# Patient Record
Sex: MALE | Race: WHITE | NOT HISPANIC OR LATINO | Employment: FULL TIME | URBAN - METROPOLITAN AREA
[De-identification: names, ages, dates, MRNs, and addresses within clinical notes are randomized per-mention and may not be internally consistent; named-entity substitution may affect disease eponyms.]

---

## 2020-08-07 ENCOUNTER — HOSPITAL ENCOUNTER (OUTPATIENT)
Dept: URGENT CARE | Facility: CLINIC | Age: 30
Discharge: HOME | End: 2020-08-07
Attending: PHYSICIAN ASSISTANT

## 2023-04-27 ENCOUNTER — OFFICE VISIT (OUTPATIENT)
Dept: FAMILY MEDICINE CLINIC | Facility: CLINIC | Age: 33
End: 2023-04-27
Payer: COMMERCIAL

## 2023-04-27 VITALS
WEIGHT: 172.6 LBS | HEIGHT: 72 IN | SYSTOLIC BLOOD PRESSURE: 132 MMHG | DIASTOLIC BLOOD PRESSURE: 76 MMHG | OXYGEN SATURATION: 100 % | HEART RATE: 65 BPM | BODY MASS INDEX: 23.38 KG/M2

## 2023-04-27 DIAGNOSIS — Z13.220 LIPID SCREENING: ICD-10-CM

## 2023-04-27 DIAGNOSIS — S33.5XXA LUMBAR BACK SPRAIN, INITIAL ENCOUNTER: Primary | ICD-10-CM

## 2023-04-27 DIAGNOSIS — Z11.59 NEED FOR HEPATITIS C SCREENING TEST: ICD-10-CM

## 2023-04-27 DIAGNOSIS — Z13.6 SCREENING FOR CARDIOVASCULAR CONDITION: ICD-10-CM

## 2023-04-27 DIAGNOSIS — Z13.1 DIABETES MELLITUS SCREENING: ICD-10-CM

## 2023-04-27 DIAGNOSIS — Z13.29 THYROID DISORDER SCREENING: ICD-10-CM

## 2023-04-27 RX ORDER — METHOCARBAMOL 750 MG/1
750 TABLET, FILM COATED ORAL 3 TIMES DAILY PRN
Qty: 60 TABLET | Refills: 0 | Status: SHIPPED | OUTPATIENT
Start: 2023-04-27

## 2023-04-27 RX ORDER — PREDNISONE 20 MG/1
20 TABLET ORAL DAILY
Qty: 10 TABLET | Refills: 0 | Status: SHIPPED | OUTPATIENT
Start: 2023-04-27

## 2023-04-27 NOTE — PROGRESS NOTES
Chief Complaint  Establish Care and Back Pain (In between shoulder blades and lower back, no injury occurred  )    Subjective          Sam Perez is a 33 y.o. male who presents to [facility]    History of Present Illness    Complains of lower back pain and between shoulder blades.  Recently has started to do stretches and that seems to have helped some.  More activity that he does the more that it bothers him.  Naproxen has helped a little.    PHQ-2 Total Score: 0   PHQ-9 Total Score: 0        Review of Systems       Medical History: has a past medical history of Asthma.     Surgical History: has a past surgical history that includes Hernia repair.     Family History: family history is not on file.     Social History: reports that he has been smoking cigarettes. He has been smoking an average of .5 packs per day. He has quit using smokeless tobacco.  His smokeless tobacco use included snuff. He reports current alcohol use. He reports that he does not use drugs.    Allergies: Brompheniramine-phenylephrine      Health Maintenance Due   Topic Date Due   • COVID-19 Vaccine (1) Never done   • Pneumococcal Vaccine 0-64 (1 - PCV) Never done   • TDAP/TD VACCINES (1 - Tdap) Never done   • HEPATITIS C SCREENING  Never done   • ANNUAL PHYSICAL  Never done            Current Outpatient Medications:   •  diclofenac (VOLTAREN) 50 MG EC tablet, Take 1 tablet by mouth 2 (Two) Times a Day As Needed (low back pain)., Disp: 60 tablet, Rfl: 2  •  methocarbamol (ROBAXIN) 750 MG tablet, Take 1 tablet by mouth 3 (Three) Times a Day As Needed for Muscle Spasms., Disp: 60 tablet, Rfl: 0  •  predniSONE (DELTASONE) 20 MG tablet, Take 1 tablet by mouth Daily., Disp: 10 tablet, Rfl: 0        There is no immunization history on file for this patient.      Objective       Vitals:    04/27/23 0704   BP: 132/76   BP Location: Left arm   Patient Position: Sitting   Cuff Size: Adult   Pulse: 65   SpO2: 100%  "  Weight: 78.3 kg (172 lb 9.6 oz)   Height: 182.9 cm (72\")   PainSc:   2   PainLoc: Back      Body mass index is 23.41 kg/m².   Wt Readings from Last 3 Encounters:   04/27/23 78.3 kg (172 lb 9.6 oz)   04/02/23 80.3 kg (177 lb)   03/22/23 78.8 kg (173 lb 11.2 oz)      BP Readings from Last 3 Encounters:   04/27/23 132/76   04/02/23 134/72   03/22/23 133/56        Physical Exam  Vitals reviewed.   Constitutional:       Appearance: Normal appearance.   HENT:      Head: Normocephalic and atraumatic.   Eyes:      Conjunctiva/sclera: Conjunctivae normal.      Pupils: Pupils are equal, round, and reactive to light.   Cardiovascular:      Rate and Rhythm: Normal rate and regular rhythm.      Pulses: Normal pulses.      Heart sounds: Normal heart sounds.   Pulmonary:      Effort: Pulmonary effort is normal.      Breath sounds: Normal breath sounds.   Abdominal:      General: Bowel sounds are normal.      Palpations: Abdomen is soft.   Musculoskeletal:      Lumbar back: Spasms and tenderness present.   Skin:     General: Skin is warm and dry.   Neurological:      Mental Status: He is alert and oriented to person, place, and time.   Psychiatric:         Mood and Affect: Mood normal.         Behavior: Behavior normal.         Thought Content: Thought content normal.         Judgment: Judgment normal.             Result Review :                          Assessment and Plan        Diagnoses and all orders for this visit:    1. Lumbar back sprain, initial encounter (Primary)  -     methocarbamol (ROBAXIN) 750 MG tablet; Take 1 tablet by mouth 3 (Three) Times a Day As Needed for Muscle Spasms.  Dispense: 60 tablet; Refill: 0  -     diclofenac (VOLTAREN) 50 MG EC tablet; Take 1 tablet by mouth 2 (Two) Times a Day As Needed (low back pain).  Dispense: 60 tablet; Refill: 2  -     predniSONE (DELTASONE) 20 MG tablet; Take 1 tablet by mouth Daily.  Dispense: 10 tablet; Refill: 0    2. Diabetes mellitus screening  -     Comprehensive " Metabolic Panel; Future    3. Lipid screening  -     Lipid Panel With / Chol / HDL Ratio; Future    4. Screening for cardiovascular condition  -     CBC & Differential; Future    5. Thyroid disorder screening  -     T4, Free; Future  -     TSH; Future    6. Need for hepatitis C screening test  -     Hepatitis C antibody; Future          Follow Up     Return in about 4 weeks (around 5/25/2023).    Patient was given instructions and counseling regarding his condition or for health maintenance advice. Please see specific information pulled into the AVS if appropriate.     Kymberly Sheets, APRN

## 2023-06-09 ENCOUNTER — OFFICE VISIT (OUTPATIENT)
Dept: FAMILY MEDICINE CLINIC | Facility: CLINIC | Age: 33
End: 2023-06-09
Payer: COMMERCIAL

## 2023-06-09 VITALS
OXYGEN SATURATION: 100 % | WEIGHT: 172.3 LBS | BODY MASS INDEX: 23.34 KG/M2 | HEIGHT: 72 IN | SYSTOLIC BLOOD PRESSURE: 113 MMHG | HEART RATE: 70 BPM | DIASTOLIC BLOOD PRESSURE: 74 MMHG

## 2023-06-09 DIAGNOSIS — S33.5XXS LUMBAR BACK SPRAIN, SEQUELA: Primary | ICD-10-CM

## 2023-06-09 RX ORDER — MELOXICAM 15 MG/1
15 TABLET ORAL DAILY
Qty: 30 TABLET | Refills: 1 | Status: SHIPPED | OUTPATIENT
Start: 2023-06-09

## 2023-06-09 RX ORDER — CYCLOBENZAPRINE HCL 10 MG
10 TABLET ORAL 3 TIMES DAILY PRN
Qty: 60 TABLET | Refills: 0 | Status: SHIPPED | OUTPATIENT
Start: 2023-06-09

## 2023-06-09 NOTE — ASSESSMENT & PLAN NOTE
Advised that it may take 8-12 weeks for pain to subside.  If it continues longer than that then advised to have a MRI of Lumbar spine.

## 2023-06-09 NOTE — PROGRESS NOTES
"Chief Complaint  Back Pain (Has got better since last appt )    Subjective          Sam Perez is a 33 y.o. male who presents to [facility]    History of Present Illness  Back pain has improved, Robaxin and diclofenac didn't seem to help much.  What helped the most was rest.  He does notice after mowing yard for heavy activity it will start hurting him again.    PHQ-2 Total Score:     PHQ-9 Total Score:          Review of Systems       Medical History: has a past medical history of Asthma.     Surgical History: has a past surgical history that includes Hernia repair.     Family History: family history is not on file.     Social History: reports that he has been smoking cigarettes. He has been smoking an average of .5 packs per day. He has quit using smokeless tobacco.  His smokeless tobacco use included snuff. He reports current alcohol use. He reports that he does not use drugs.    Allergies: Brompheniramine-phenylephrine      Health Maintenance Due   Topic Date Due    COVID-19 Vaccine (1) Never done    Pneumococcal Vaccine 0-64 (1 - PCV) Never done    TDAP/TD VACCINES (1 - Tdap) Never done    HEPATITIS C SCREENING  Never done    ANNUAL PHYSICAL  Never done            Current Outpatient Medications:     cyclobenzaprine (FLEXERIL) 10 MG tablet, Take 1 tablet by mouth 3 (Three) Times a Day As Needed for Muscle Spasms., Disp: 60 tablet, Rfl: 0    meloxicam (Mobic) 15 MG tablet, Take 1 tablet by mouth Daily., Disp: 30 tablet, Rfl: 1      Immunization History   Administered Date(s) Administered    MMR 04/16/1997         Objective       Vitals:    06/09/23 0706   BP: 113/74   BP Location: Left arm   Patient Position: Sitting   Cuff Size: Adult   Pulse: 70   SpO2: 100%   Weight: 78.2 kg (172 lb 4.8 oz)   Height: 182.9 cm (72\")   PainSc:   2   PainLoc: Back      Body mass index is 23.37 kg/m².   Wt Readings from Last 3 Encounters:   06/09/23 78.2 kg (172 lb 4.8 oz)   04/27/23 " 78.3 kg (172 lb 9.6 oz)   04/02/23 80.3 kg (177 lb)      BP Readings from Last 3 Encounters:   06/09/23 113/74   04/27/23 132/76   04/02/23 134/72        Physical Exam  Vitals reviewed.   Constitutional:       Appearance: Normal appearance.   HENT:      Head: Normocephalic and atraumatic.   Eyes:      Conjunctiva/sclera: Conjunctivae normal.      Pupils: Pupils are equal, round, and reactive to light.   Cardiovascular:      Rate and Rhythm: Normal rate and regular rhythm.      Pulses: Normal pulses.      Heart sounds: Normal heart sounds.   Pulmonary:      Effort: Pulmonary effort is normal.      Breath sounds: Normal breath sounds.   Abdominal:      General: Bowel sounds are normal.      Palpations: Abdomen is soft.   Skin:     General: Skin is warm and dry.   Neurological:      Mental Status: He is alert and oriented to person, place, and time.   Psychiatric:         Mood and Affect: Mood normal.         Behavior: Behavior normal.         Thought Content: Thought content normal.         Judgment: Judgment normal.           Result Review :                          Assessment and Plan        Diagnoses and all orders for this visit:    1. Lumbar back sprain, sequela (Primary)  Assessment & Plan:  Advised that it may take 8-12 weeks for pain to subside.  If it continues longer than that then advised to have a MRI of Lumbar spine.    Orders:  -     cyclobenzaprine (FLEXERIL) 10 MG tablet; Take 1 tablet by mouth 3 (Three) Times a Day As Needed for Muscle Spasms.  Dispense: 60 tablet; Refill: 0  -     meloxicam (Mobic) 15 MG tablet; Take 1 tablet by mouth Daily.  Dispense: 30 tablet; Refill: 1          Follow Up     Return if symptoms worsen or fail to improve.    Patient was given instructions and counseling regarding his condition or for health maintenance advice. Please see specific information pulled into the AVS if appropriate.     Kymberly Sheets, MARIA DOLORES